# Patient Record
Sex: FEMALE | Race: WHITE | Employment: UNEMPLOYED | URBAN - NONMETROPOLITAN AREA
[De-identification: names, ages, dates, MRNs, and addresses within clinical notes are randomized per-mention and may not be internally consistent; named-entity substitution may affect disease eponyms.]

---

## 2023-03-17 ENCOUNTER — OFFICE VISIT (OUTPATIENT)
Dept: FAMILY MEDICINE CLINIC | Age: 53
End: 2023-03-17
Payer: COMMERCIAL

## 2023-03-17 ENCOUNTER — NURSE ONLY (OUTPATIENT)
Dept: LAB | Age: 53
End: 2023-03-17

## 2023-03-17 ENCOUNTER — TELEPHONE (OUTPATIENT)
Dept: FAMILY MEDICINE CLINIC | Age: 53
End: 2023-03-17

## 2023-03-17 VITALS
DIASTOLIC BLOOD PRESSURE: 84 MMHG | TEMPERATURE: 97.8 F | BODY MASS INDEX: 23.68 KG/M2 | SYSTOLIC BLOOD PRESSURE: 120 MMHG | HEART RATE: 85 BPM | OXYGEN SATURATION: 99 % | WEIGHT: 125.4 LBS | HEIGHT: 61 IN

## 2023-03-17 DIAGNOSIS — E78.2 MIXED HYPERLIPIDEMIA: ICD-10-CM

## 2023-03-17 DIAGNOSIS — D18.02 CAVERNOUS HEMANGIOMA OF BRAIN (HCC): ICD-10-CM

## 2023-03-17 DIAGNOSIS — K21.9 GASTROESOPHAGEAL REFLUX DISEASE WITHOUT ESOPHAGITIS: ICD-10-CM

## 2023-03-17 DIAGNOSIS — E55.9 HYPOVITAMINOSIS D: ICD-10-CM

## 2023-03-17 DIAGNOSIS — Z00.01 ENCOUNTER FOR WELL ADULT EXAM WITH ABNORMAL FINDINGS: Primary | ICD-10-CM

## 2023-03-17 DIAGNOSIS — J30.9 ALLERGIC SINUSITIS: ICD-10-CM

## 2023-03-17 LAB
25(OH)D3 SERPL-MCNC: 17 NG/ML (ref 30–100)
ALBUMIN SERPL BCG-MCNC: 4.5 G/DL (ref 3.5–5.1)
ALP SERPL-CCNC: 74 U/L (ref 38–126)
ALT SERPL W/O P-5'-P-CCNC: 6 U/L (ref 11–66)
ANION GAP SERPL CALC-SCNC: 9 MEQ/L (ref 8–16)
AST SERPL-CCNC: 10 U/L (ref 5–40)
BASOPHILS ABSOLUTE: 0.1 THOU/MM3 (ref 0–0.1)
BASOPHILS NFR BLD AUTO: 0.9 %
BILIRUB SERPL-MCNC: 0.4 MG/DL (ref 0.3–1.2)
BUN SERPL-MCNC: 13 MG/DL (ref 7–22)
CALCIUM SERPL-MCNC: 9.5 MG/DL (ref 8.5–10.5)
CHLORIDE SERPL-SCNC: 103 MEQ/L (ref 98–111)
CHOLEST SERPL-MCNC: 221 MG/DL (ref 100–199)
CO2 SERPL-SCNC: 27 MEQ/L (ref 23–33)
CREAT SERPL-MCNC: 0.7 MG/DL (ref 0.4–1.2)
DEPRECATED RDW RBC AUTO: 41.3 FL (ref 35–45)
EOSINOPHIL NFR BLD AUTO: 1.2 %
EOSINOPHILS ABSOLUTE: 0.1 THOU/MM3 (ref 0–0.4)
ERYTHROCYTE [DISTWIDTH] IN BLOOD BY AUTOMATED COUNT: 12.5 % (ref 11.5–14.5)
GFR SERPL CREATININE-BSD FRML MDRD: > 60 ML/MIN/1.73M2
GLUCOSE SERPL-MCNC: 91 MG/DL (ref 70–108)
HCT VFR BLD AUTO: 40.6 % (ref 37–47)
HDLC SERPL-MCNC: 45 MG/DL
HGB BLD-MCNC: 13.5 GM/DL (ref 12–16)
IMM GRANULOCYTES # BLD AUTO: 0.02 THOU/MM3 (ref 0–0.07)
IMM GRANULOCYTES NFR BLD AUTO: 0.4 %
LDLC SERPL CALC-MCNC: 145 MG/DL
LYMPHOCYTES ABSOLUTE: 1.4 THOU/MM3 (ref 1–4.8)
LYMPHOCYTES NFR BLD AUTO: 25.6 %
MCH RBC QN AUTO: 30.4 PG (ref 26–33)
MCHC RBC AUTO-ENTMCNC: 33.3 GM/DL (ref 32.2–35.5)
MCV RBC AUTO: 91.4 FL (ref 81–99)
MONOCYTES ABSOLUTE: 0.5 THOU/MM3 (ref 0.4–1.3)
MONOCYTES NFR BLD AUTO: 8.3 %
NEUTROPHILS NFR BLD AUTO: 63.6 %
NRBC BLD AUTO-RTO: 0 /100 WBC
PLATELET # BLD AUTO: 338 THOU/MM3 (ref 130–400)
PMV BLD AUTO: 10.7 FL (ref 9.4–12.4)
POTASSIUM SERPL-SCNC: 4.5 MEQ/L (ref 3.5–5.2)
PROT SERPL-MCNC: 6.9 G/DL (ref 6.1–8)
RBC # BLD AUTO: 4.44 MILL/MM3 (ref 4.2–5.4)
SEGMENTED NEUTROPHILS ABSOLUTE COUNT: 3.6 THOU/MM3 (ref 1.8–7.7)
SODIUM SERPL-SCNC: 139 MEQ/L (ref 135–145)
TRIGL SERPL-MCNC: 153 MG/DL (ref 0–199)
TSH SERPL DL<=0.005 MIU/L-ACNC: 1.95 UIU/ML (ref 0.4–4.2)
WBC # BLD AUTO: 5.6 THOU/MM3 (ref 4.8–10.8)

## 2023-03-17 PROCEDURE — 99386 PREV VISIT NEW AGE 40-64: CPT | Performed by: NURSE PRACTITIONER

## 2023-03-17 RX ORDER — ERGOCALCIFEROL 1.25 MG/1
50000 CAPSULE ORAL WEEKLY
Qty: 12 CAPSULE | Refills: 0 | Status: SHIPPED | OUTPATIENT
Start: 2023-03-17 | End: 2023-06-15

## 2023-03-17 RX ORDER — ACETAMINOPHEN 500 MG
500 TABLET ORAL EVERY 6 HOURS PRN
COMMUNITY

## 2023-03-17 SDOH — ECONOMIC STABILITY: INCOME INSECURITY: HOW HARD IS IT FOR YOU TO PAY FOR THE VERY BASICS LIKE FOOD, HOUSING, MEDICAL CARE, AND HEATING?: NOT HARD AT ALL

## 2023-03-17 SDOH — ECONOMIC STABILITY: HOUSING INSECURITY
IN THE LAST 12 MONTHS, WAS THERE A TIME WHEN YOU DID NOT HAVE A STEADY PLACE TO SLEEP OR SLEPT IN A SHELTER (INCLUDING NOW)?: NO

## 2023-03-17 SDOH — ECONOMIC STABILITY: FOOD INSECURITY: WITHIN THE PAST 12 MONTHS, YOU WORRIED THAT YOUR FOOD WOULD RUN OUT BEFORE YOU GOT MONEY TO BUY MORE.: NEVER TRUE

## 2023-03-17 SDOH — ECONOMIC STABILITY: FOOD INSECURITY: WITHIN THE PAST 12 MONTHS, THE FOOD YOU BOUGHT JUST DIDN'T LAST AND YOU DIDN'T HAVE MONEY TO GET MORE.: NEVER TRUE

## 2023-03-17 ASSESSMENT — ENCOUNTER SYMPTOMS
EYE PAIN: 0
ABDOMINAL DISTENTION: 0
ABDOMINAL PAIN: 0
EYE ITCHING: 1
RHINORRHEA: 1
EYE REDNESS: 0
CONSTIPATION: 0
SINUS PRESSURE: 1
SHORTNESS OF BREATH: 0
DIARRHEA: 0
CHEST TIGHTNESS: 0
WHEEZING: 0
COUGH: 0

## 2023-03-17 ASSESSMENT — PATIENT HEALTH QUESTIONNAIRE - PHQ9
SUM OF ALL RESPONSES TO PHQ QUESTIONS 1-9: 2
SUM OF ALL RESPONSES TO PHQ QUESTIONS 1-9: 2
1. LITTLE INTEREST OR PLEASURE IN DOING THINGS: 1
SUM OF ALL RESPONSES TO PHQ9 QUESTIONS 1 & 2: 2
2. FEELING DOWN, DEPRESSED OR HOPELESS: 1
SUM OF ALL RESPONSES TO PHQ QUESTIONS 1-9: 2
SUM OF ALL RESPONSES TO PHQ QUESTIONS 1-9: 2

## 2023-03-17 NOTE — TELEPHONE ENCOUNTER
----- Message from Chrisandra Jeans, APRN - CNP sent at 3/17/2023  5:06 PM EDT -----  Labs show diminished vitamin D levels, and slightly elevated cholesterol level. Will supplement the vitamin D for 3 months then recheck levels. Will hold off on cholesterol medication at this time. Continue to work with diet and activity level. Low sugar low cholesterol diet. Otherwise labs were wnl.

## 2023-03-17 NOTE — PATIENT INSTRUCTIONS
High Cholesterol: Care Instructions  Overview     Cholesterol is a type of fat in your blood. It is needed for many body functions, such as making new cells. Cholesterol is made by your body. It also comes from food you eat. High cholesterol means that you have too much of the fat in your blood. This raises your risk of a heart attack and stroke. LDL and HDL are part of your total cholesterol. LDL is the \"bad\" cholesterol. High LDL can raise your risk for coronary artery disease, heart attack, and stroke. HDL is the \"good\" cholesterol. It helps clear bad cholesterol from the body. High HDL is linked with a lower risk of coronary artery disease, heart attack, and stroke. Your cholesterol levels help your doctor find out your risk for having a heart attack or stroke. You and your doctor can talk about whether you need to lower your risk and what treatment is best for you. Treatment options include a heart-healthy lifestyle and medicine. Both options can help lower your cholesterol and your risk. The way you choose to lower your risk will depend on how high your risk is for heart attack and stroke. It will also depend on how you feel about taking medicines. Follow-up care is a key part of your treatment and safety. Be sure to make and go to all appointments, and call your doctor if you are having problems. It's also a good idea to know your test results and keep a list of the medicines you take. How can you care for yourself at home? Eat heart-healthy foods. Eat fruits, vegetables, whole grains, beans, and other high-fiber foods. Eat lean proteins, such as seafood, lean meats, beans, nuts, and soy products. Eat healthy fats, such as canola and olive oil. Choose foods that are low in saturated fat. Limit sodium and alcohol. Limit drinks and foods with added sugar. Be physically active. Try to do moderate activity at least 2½ hours a week. Or try to do vigorous activity at least 1¼ hours a week.  You may want to walk or try other activities, such as running, swimming, cycling, or playing tennis or team sports. Stay at a healthy weight or lose weight by making the changes in eating and physical activity listed above. Losing just a small amount of weight, even 5 to 10 pounds, can help reduce your risk for having a heart attack or stroke. Do not smoke. Manage other health problems. These include diabetes and high blood pressure. If you think you may have a problem with alcohol or drug use, talk to your doctor. If you take medicine, take it exactly as prescribed. Call your doctor if you think you are having a problem with your medicine. Check with your doctor or pharmacist before you use any other medicines, including over-the-counter medicines. Make sure your doctor knows all of the medicines, vitamins, herbal products, and supplements you take. Taking some medicines together can cause problems. When should you call for help? Watch closely for changes in your health, and be sure to contact your doctor if:    You need help making lifestyle changes. You have questions about your medicine. Where can you learn more? Go to http://GoSpotCheck.woods.com/ and enter I065 to learn more about \"High Cholesterol: Care Instructions. \"  Current as of: September 7, 2022               Content Version: 13.5  © 2006-2022 Healthwise, [employer]. Care instructions adapted under license by [facility] ([facility]). If you have questions about a medical condition or this instruction, always ask your healthcare professional. [place] [address] any warranty or liability for your use of this information. Learning About High Cholesterol  What is high cholesterol? High cholesterol means that you have too much cholesterol in your blood. Cholesterol is a type of fat. It's needed for many body functions, such as making new cells. It's made by your body.  It also comes from food you eat (meat and dairy products). Having high cholesterol can lead to the buildup of fatty deposits called plaque (say \"plak\") in artery walls. This can increase your risk of heart attack and stroke. When doctors talk about high cholesterol, they are talking about your total cholesterol and LDL cholesterol levels. LDL is sometimes called the \"bad\" cholesterol. Your doctor may also speak about HDL levels. HDL is sometimes called the \"good\" cholesterol. High HDL is linked with a lower risk for heart attack and stroke. How can you help prevent high cholesterol? Eat high-fiber foods, such as fruits, vegetables, and whole grains. Eat lean proteins, such as seafood, lean meats, and beans. Eat healthy fats, such as canola and olive oil. Choose foods that are low in saturated fat, such as avocados, nuts, and low-fat milk or yogurt. Limit sodium and alcohol. Limit drinks and foods with added sugar. Try to be active at least 2½ hours a week. Get to and stay at a healthy weight. Don't use tobacco or nicotine, and talk to your doctor if you need help quitting. How is high cholesterol treated? Treatment focuses on heart-healthy lifestyle changes. It may also include medicines. Treatment reduces your risk for a heart attack or stroke. The goal of treatment is to lower your LDL cholesterol levels. Where can you learn more? Go to http://www.woods.com/ and enter Q621 to learn more about \"Learning About High Cholesterol. \"  Current as of: October 6, 2021               Content Version: 13.5  © 2006-2022 [employer]. Care instructions adapted under license by [facility] ([facility]). If you have questions about a medical condition or this instruction, always ask your healthcare professional. [place] [address] any warranty or liability for your use of this information. Learning About High Triglycerides  What are high triglycerides?      Triglycerides are a type of fat in your blood. Your body uses them for energy. You need some for good health. But high triglyceride levels are linked with a higher risk of coronary artery disease. A high level may be a sign of metabolic syndrome. Very high levels raise your risk of pancreatitis. What causes them? High triglycerides can run in families. They may also be caused by other conditions, like obesity and diabetes. You may have high levels of this fat if you eat or drink too many foods or drinks with added sugar or if you drink a lot of alcohol. And some medicines can cause this condition. What are their symptoms? High triglycerides usually don't cause symptoms. But if the condition is genetic, you may see fatty bumps under your skin. How are they diagnosed? A blood test is used to measure triglycerides. It's most accurate if it's done after you go without food or drink for 9 to 14 hours (fasting). Triglyceride levels are:  Normal when they are less than 150 mg/dL. Moderately high when they are 150 to 499 mg/dL. Very high when they are 500 mg/dL or higher. How are they treated? A healthy lifestyle can help lower your triglycerides and your risk of coronary artery disease. It includes losing weight, being active, limiting high-sugar foods and drinks, and limiting alcohol. Your doctor may recommend that you also take medicine. Your doctor will treat other health problems if they are causing high levels. How do you care for yourself when you have high triglycerides? A healthy diet and lifestyle can help lower your triglycerides level and lower your risk of coronary artery disease. Lose weight, and stay at a healthy weight. Triglycerides are stored as fat in your tissues and muscles. Limit foods and drinks that have a lot of sugar. These include sugar-sweetened desserts, soda pop, and fruit juice. Limit saturated fats. These are found in animal-based foods like meat, butter, milk, and cheese.  They are also found in coconut oil, palm oil, and cocoa butter. Choose a heart-healthy eating plan. Eat a diet that's rich in vegetables, whole grains, fish, lean meats, and low-fat or nonfat dairy foods. Eating oily fish may lower your levels. These fish include salmon, mackerel, lake trout, herring, and sardines. Limit or avoid alcohol. Limit alcohol to 2 drinks a day for men and 1 drink a day for women. Alcohol has a strong effect on triglycerides. Be active on most days of the week. Before you start to be more active, check with your doctor to be sure it's safe. Try to do moderate activity at least 2½ hours a week. Or try to do vigorous activity at least 1¼ hours a week. If you have diabetes, keep your blood sugar in your target range. Don't smoke. If you need help quitting, talk to your doctor about stop-smoking programs and medicines. These can increase your chances of quitting for good. Take your medicines exactly as prescribed. Call your doctor if you think you are having a problem with your medicine. Where can you learn more? Go to http://www.woods.com/ and enter T123 to learn more about \"Learning About High Triglycerides. \"  Current as of: September 7, 2022               Content Version: 13.5  © 2006-2022 Healthwise, [employer]. Care instructions adapted under license by [facility] ([facility]). If you have questions about a medical condition or this instruction, always ask your healthcare professional. [place] [address] any warranty or liability for your use of this information.

## 2023-03-17 NOTE — TELEPHONE ENCOUNTER
I copy and pasted Beatriz's response regarding the patient's lab results and sent it to the patient via My Chart.

## 2023-03-17 NOTE — PROGRESS NOTES
[address] [facility]  Dept: [phone]          Karyle Lins (:  1970) is a 48 y.o. female,New patient, here for evaluation of the following chief complaint(s):  New Patient, Establish Care, and Eye Problem      ASSESSMENT/PLAN:  I have reviewed the patient's medical history in detail and updated the computerized patient record. HPI/ROS per the patient and caregiver. Overall non toxic in appearance. Answers questions appropriately. Conditions discussed and addressed this visit include:   Here to establish care  Overall well adult  No hx of htn  + hemangioma, being monitored at this time  Will need neuro referral  Refer to ENT for chronic allergic sinusitis  Well adult information provided to pt. The patient is advised to begin progressive daily aerobic exercise program, follow a low fat, low cholesterol diet, attempt to lose weight, reduce salt in diet and cooking, reduce exposure to stress, improve dietary compliance, continue current medications, and continue current healthy lifestyle patterns. 1. Encounter for well adult exam with abnormal findings  2. Cavernous hemangioma of brain (HCC)  -     CBC with Auto Differential; Future  -     TSH with Reflex; Future  3. Allergic sinusitis  -     External Referral To ENT  -     CBC with Auto Differential; Future  4. Gastroesophageal reflux disease without esophagitis  -     Comprehensive Metabolic Panel; Future  -     CBC with Auto Differential; Future  -     TSH with Reflex; Future  5. Mixed hyperlipidemia  -     Lipid Panel; Future  -     TSH with Reflex; Future  6. Hypovitaminosis D  -     Vitamin D 25 Hydroxy; Future    Return in about 3 months (around 2023) for Results review, Routine follow up. SUBJECTIVE/OBJECTIVE:  Here to establish care  Hx of hemangioma in 2020  Hx of allergies  Sinus problem  Perimenopausal   Takes desensitizing therapy for allergies    Eye Problem   Associated symptoms include itching. Pertinent negatives include no eye redness or weakness. Review of Systems   Constitutional:  Negative for activity change, appetite change, fatigue and unexpected weight change. HENT:  Positive for congestion, postnasal drip, rhinorrhea and sinus pressure. Negative for ear discharge, ear pain, nosebleeds and tinnitus. Eyes:  Positive for itching. Negative for pain and redness. Respiratory:  Negative for cough, chest tightness, shortness of breath and wheezing. Cardiovascular:  Negative for chest pain. Gastrointestinal:  Negative for abdominal distention, abdominal pain, constipation and diarrhea. Endocrine: Negative for cold intolerance and heat intolerance. Genitourinary:  Positive for menstrual problem. Negative for difficulty urinating and dysuria. Musculoskeletal:  Negative for arthralgias and myalgias. Skin:  Negative for pallor and rash. Allergic/Immunologic: Negative for environmental allergies, food allergies and immunocompromised state. Neurological:  Negative for dizziness, weakness, light-headedness and numbness. Hematological:  Negative for adenopathy. Does not bruise/bleed easily. Psychiatric/Behavioral:  Negative for behavioral problems and decreased concentration. The patient is not nervous/anxious. All other systems reviewed and are negative. Physical Exam  Vitals and nursing note reviewed. Constitutional:       Appearance: Normal appearance. She is well-developed and normal weight. She is not ill-appearing. HENT:      Head: Normocephalic and atraumatic. Right Ear: Tympanic membrane, ear canal and external ear normal.      Left Ear: Tympanic membrane, ear canal and external ear normal.      Nose: Rhinorrhea (clear) present. Mouth/Throat:      Mouth: Mucous membranes are moist.      Pharynx: No oropharyngeal exudate or posterior oropharyngeal erythema. Eyes:      General:         Right eye: No discharge. Left eye: No discharge. Conjunctiva/sclera: Conjunctivae normal.   Neck:      Thyroid: No thyromegaly. Cardiovascular:      Rate and Rhythm: Normal rate and regular rhythm. Pulses: Normal pulses. Heart sounds: Normal heart sounds. Pulmonary:      Effort: Pulmonary effort is normal. No respiratory distress. Breath sounds: Normal breath sounds. No wheezing or rales. Chest:      Chest wall: No tenderness. Abdominal:      General: Bowel sounds are normal. There is no distension. Palpations: Abdomen is soft. Tenderness: There is no abdominal tenderness. Musculoskeletal:         General: No tenderness. Normal range of motion. Cervical back: Normal range of motion and neck supple. No tenderness. No muscular tenderness. Lymphadenopathy:      Cervical: No cervical adenopathy. Skin:     General: Skin is warm and dry. Capillary Refill: Capillary refill takes less than 2 seconds. Coloration: Skin is not pale. Findings: No erythema or rash. Neurological:      General: No focal deficit present. Mental Status: She is alert and oriented to person, place, and time. Mental status is at baseline. Cranial Nerves: No cranial nerve deficit. Motor: No abnormal muscle tone. Coordination: Coordination normal.      Deep Tendon Reflexes: Reflexes are normal and symmetric. Reflexes normal.   Psychiatric:         Behavior: Behavior normal.         Thought Content: Thought content normal.         Judgment: Judgment normal.               An electronic signature was used to authenticate this note.     --JOSE Almonte - CNP

## 2023-03-20 NOTE — TELEPHONE ENCOUNTER
I wrote on the lab order to have it done on or around 06- and hi-lited it. I then gave it to Magdalene to mail to the patient today.

## 2023-04-17 ENCOUNTER — NURSE ONLY (OUTPATIENT)
Dept: LAB | Age: 53
End: 2023-04-17

## 2023-04-18 ENCOUNTER — HOSPITAL ENCOUNTER (OUTPATIENT)
Dept: MAMMOGRAPHY | Age: 53
Discharge: HOME | End: 2023-04-18
Payer: COMMERCIAL

## 2023-04-18 DIAGNOSIS — Z12.31 VISIT FOR SCREENING MAMMOGRAM: ICD-10-CM

## 2023-04-18 PROCEDURE — 77063 BREAST TOMOSYNTHESIS BI: CPT

## 2023-05-02 LAB — CYTOLOGY THIN PREP PAP: NORMAL

## 2023-06-20 ENCOUNTER — NURSE ONLY (OUTPATIENT)
Dept: LAB | Age: 53
End: 2023-06-20

## 2023-06-20 ENCOUNTER — TELEPHONE (OUTPATIENT)
Dept: FAMILY MEDICINE CLINIC | Age: 53
End: 2023-06-20

## 2023-06-20 DIAGNOSIS — E55.9 HYPOVITAMINOSIS D: ICD-10-CM

## 2023-06-20 LAB — 25(OH)D3 SERPL-MCNC: 43 NG/ML (ref 30–100)

## 2023-06-20 NOTE — TELEPHONE ENCOUNTER
----- Message from JOSE Harrell CNP sent at 6/20/2023  2:16 PM EDT -----  Vitamin D wnl at this time. Can stop the vitamin d supplementation. Should be on a womens one  day vitamin to maintain levels.

## 2023-06-20 NOTE — TELEPHONE ENCOUNTER
I copy and pasted Beatriz's response regarding the patient's lab results and sent them to the patient via My Chart.

## 2023-06-21 NOTE — TELEPHONE ENCOUNTER
I called and spoke to the patient. I let her know Beatriz's response regarding her vitamin d results. She voiced understanding.

## 2024-03-24 ASSESSMENT — PATIENT HEALTH QUESTIONNAIRE - PHQ9
SUM OF ALL RESPONSES TO PHQ9 QUESTIONS 1 & 2: 1
2. FEELING DOWN, DEPRESSED OR HOPELESS: NOT AT ALL
SUM OF ALL RESPONSES TO PHQ9 QUESTIONS 1 & 2: 1
1. LITTLE INTEREST OR PLEASURE IN DOING THINGS: SEVERAL DAYS
SUM OF ALL RESPONSES TO PHQ QUESTIONS 1-9: 1
1. LITTLE INTEREST OR PLEASURE IN DOING THINGS: SEVERAL DAYS
SUM OF ALL RESPONSES TO PHQ QUESTIONS 1-9: 1
SUM OF ALL RESPONSES TO PHQ QUESTIONS 1-9: 1
2. FEELING DOWN, DEPRESSED OR HOPELESS: NOT AT ALL
SUM OF ALL RESPONSES TO PHQ QUESTIONS 1-9: 1

## 2024-03-27 ENCOUNTER — OFFICE VISIT (OUTPATIENT)
Dept: FAMILY MEDICINE CLINIC | Age: 54
End: 2024-03-27
Payer: COMMERCIAL

## 2024-03-27 ENCOUNTER — NURSE ONLY (OUTPATIENT)
Dept: LAB | Age: 54
End: 2024-03-27

## 2024-03-27 ENCOUNTER — TELEPHONE (OUTPATIENT)
Dept: FAMILY MEDICINE CLINIC | Age: 54
End: 2024-03-27

## 2024-03-27 VITALS
WEIGHT: 129 LBS | BODY MASS INDEX: 25.32 KG/M2 | DIASTOLIC BLOOD PRESSURE: 62 MMHG | HEIGHT: 60 IN | HEART RATE: 60 BPM | TEMPERATURE: 96.1 F | SYSTOLIC BLOOD PRESSURE: 100 MMHG | OXYGEN SATURATION: 99 %

## 2024-03-27 DIAGNOSIS — E55.9 HYPOVITAMINOSIS D: ICD-10-CM

## 2024-03-27 DIAGNOSIS — Z00.01 ENCOUNTER FOR WELL ADULT EXAM WITH ABNORMAL FINDINGS: ICD-10-CM

## 2024-03-27 DIAGNOSIS — K21.9 GASTROESOPHAGEAL REFLUX DISEASE WITHOUT ESOPHAGITIS: ICD-10-CM

## 2024-03-27 DIAGNOSIS — D18.02 CAVERNOUS HEMANGIOMA OF BRAIN (HCC): Primary | ICD-10-CM

## 2024-03-27 DIAGNOSIS — E78.2 MIXED HYPERLIPIDEMIA: ICD-10-CM

## 2024-03-27 DIAGNOSIS — D18.02 CAVERNOUS HEMANGIOMA OF BRAIN (HCC): ICD-10-CM

## 2024-03-27 LAB
25(OH)D3 SERPL-MCNC: 19 NG/ML (ref 30–100)
ALBUMIN SERPL BCG-MCNC: 4.3 G/DL (ref 3.5–5.1)
ALP SERPL-CCNC: 58 U/L (ref 38–126)
ALT SERPL W/O P-5'-P-CCNC: 7 U/L (ref 11–66)
ANION GAP SERPL CALC-SCNC: 11 MEQ/L (ref 8–16)
AST SERPL-CCNC: 11 U/L (ref 5–40)
BASOPHILS ABSOLUTE: 0 THOU/MM3 (ref 0–0.1)
BASOPHILS NFR BLD AUTO: 0.5 %
BILIRUB SERPL-MCNC: 0.5 MG/DL (ref 0.3–1.2)
BUN SERPL-MCNC: 14 MG/DL (ref 7–22)
CALCIUM SERPL-MCNC: 8.9 MG/DL (ref 8.5–10.5)
CHLORIDE SERPL-SCNC: 104 MEQ/L (ref 98–111)
CHOLEST SERPL-MCNC: 224 MG/DL (ref 100–199)
CO2 SERPL-SCNC: 24 MEQ/L (ref 23–33)
CREAT SERPL-MCNC: 0.8 MG/DL (ref 0.4–1.2)
DEPRECATED RDW RBC AUTO: 41.2 FL (ref 35–45)
EOSINOPHIL NFR BLD AUTO: 0.5 %
EOSINOPHILS ABSOLUTE: 0 THOU/MM3 (ref 0–0.4)
ERYTHROCYTE [DISTWIDTH] IN BLOOD BY AUTOMATED COUNT: 12.4 % (ref 11.5–14.5)
GFR SERPL CREATININE-BSD FRML MDRD: 89 ML/MIN/1.73M2
GLUCOSE SERPL-MCNC: 87 MG/DL (ref 70–108)
HCT VFR BLD AUTO: 40.5 % (ref 37–47)
HDLC SERPL-MCNC: 52 MG/DL
HGB BLD-MCNC: 13.7 GM/DL (ref 12–16)
IMM GRANULOCYTES # BLD AUTO: 0.02 THOU/MM3 (ref 0–0.07)
IMM GRANULOCYTES NFR BLD AUTO: 0.3 %
LDLC SERPL CALC-MCNC: 147 MG/DL
LYMPHOCYTES ABSOLUTE: 1.4 THOU/MM3 (ref 1–4.8)
LYMPHOCYTES NFR BLD AUTO: 22.4 %
MCH RBC QN AUTO: 30.9 PG (ref 26–33)
MCHC RBC AUTO-ENTMCNC: 33.8 GM/DL (ref 32.2–35.5)
MCV RBC AUTO: 91.2 FL (ref 81–99)
MONOCYTES ABSOLUTE: 0.5 THOU/MM3 (ref 0.4–1.3)
MONOCYTES NFR BLD AUTO: 7.5 %
NEUTROPHILS NFR BLD AUTO: 68.8 %
NRBC BLD AUTO-RTO: 0 /100 WBC
PLATELET # BLD AUTO: 299 THOU/MM3 (ref 130–400)
PMV BLD AUTO: 11 FL (ref 9.4–12.4)
POTASSIUM SERPL-SCNC: 4.1 MEQ/L (ref 3.5–5.2)
PROT SERPL-MCNC: 7 G/DL (ref 6.1–8)
RBC # BLD AUTO: 4.44 MILL/MM3 (ref 4.2–5.4)
SEGMENTED NEUTROPHILS ABSOLUTE COUNT: 4.3 THOU/MM3 (ref 1.8–7.7)
SODIUM SERPL-SCNC: 139 MEQ/L (ref 135–145)
TRIGL SERPL-MCNC: 127 MG/DL (ref 0–199)
TSH SERPL DL<=0.005 MIU/L-ACNC: 1.94 UIU/ML (ref 0.4–4.2)
WBC # BLD AUTO: 6.2 THOU/MM3 (ref 4.8–10.8)

## 2024-03-27 PROCEDURE — 99396 PREV VISIT EST AGE 40-64: CPT | Performed by: NURSE PRACTITIONER

## 2024-03-27 SDOH — ECONOMIC STABILITY: FOOD INSECURITY: WITHIN THE PAST 12 MONTHS, YOU WORRIED THAT YOUR FOOD WOULD RUN OUT BEFORE YOU GOT MONEY TO BUY MORE.: NEVER TRUE

## 2024-03-27 SDOH — ECONOMIC STABILITY: FOOD INSECURITY: WITHIN THE PAST 12 MONTHS, THE FOOD YOU BOUGHT JUST DIDN'T LAST AND YOU DIDN'T HAVE MONEY TO GET MORE.: NEVER TRUE

## 2024-03-27 SDOH — ECONOMIC STABILITY: INCOME INSECURITY: HOW HARD IS IT FOR YOU TO PAY FOR THE VERY BASICS LIKE FOOD, HOUSING, MEDICAL CARE, AND HEATING?: NOT HARD AT ALL

## 2024-03-27 ASSESSMENT — ENCOUNTER SYMPTOMS
CONSTIPATION: 0
ABDOMINAL DISTENTION: 0
EYE PAIN: 0
DIARRHEA: 0
CHEST TIGHTNESS: 0
EYE REDNESS: 0
SHORTNESS OF BREATH: 0
WHEEZING: 0
COUGH: 0
ABDOMINAL PAIN: 0

## 2024-03-27 NOTE — PROGRESS NOTES
[address] [place] [id number]  Dept: [phone]          Paula Benavides (:  1970) is a 54 y.o. female,Established patient, here for evaluation of the following chief complaint(s):  Annual Exam and Forms (For her husbands insurance.)      ASSESSMENT/PLAN:  I have reviewed the patient's medical history in detail and updated the computerized patient record.  HPI/ROS per the patient and caregiver.   Overall non toxic in appearance. Answers questions appropriately.   Conditions discussed and addressed this visit include:   The 10-year ASCVD risk score (Alexandr FIGUEROA, et al., 2019) is: 1.3%    Values used to calculate the score:      Age: 54 years      Sex: Female      Is Non- : No      Diabetic: No      Tobacco smoker: No      Systolic Blood Pressure: 100 mmHg      Is BP treated: No      HDL Cholesterol: 45 mg/dL      Total Cholesterol: 221 mg/dL    1. Cavernous hemangioma of brain (HCC)  -     Lipid Panel; Future  -     Comprehensive Metabolic Panel; Future  -     CBC with Auto Differential; Future  -     TSH with Reflex; Future  -     Vitamin D 25 Hydroxy; Future  2. Encounter for well adult exam with abnormal findings  -     Lipid Panel; Future  -     Comprehensive Metabolic Panel; Future  -     CBC with Auto Differential; Future  -     TSH with Reflex; Future  -     Vitamin D 25 Hydroxy; Future  3. Mixed hyperlipidemia  -     Lipid Panel; Future  -     Comprehensive Metabolic Panel; Future  -     CBC with Auto Differential; Future  -     TSH with Reflex; Future  -     Vitamin D 25 Hydroxy; Future  4. Hypovitaminosis D  -     Lipid Panel; Future  -     Comprehensive Metabolic Panel; Future  -     CBC with Auto Differential; Future  -     TSH with Reflex; Future  -     Vitamin D 25 Hydroxy; Future  5. Gastroesophageal reflux disease without esophagitis  -     Lipid Panel; Future  -     Comprehensive Metabolic Panel; Future  -     CBC with Auto Differential; Future  -

## 2024-03-27 NOTE — TELEPHONE ENCOUNTER
----- Message from JOSE Cervantes - CNP sent at 3/27/2024  3:04 PM EDT -----    Labs wnl. Would supplement Vitamin D3 with 5000 IU per day for total of 35, 000 per week. 1.4% risk score for cardiovascular event. Reheck labs in 1 year. Continue healthy lifestyle patterns.

## 2024-04-19 ENCOUNTER — HOSPITAL ENCOUNTER (OUTPATIENT)
Dept: MAMMOGRAPHY | Age: 54
Discharge: HOME | End: 2024-04-19
Payer: COMMERCIAL

## 2024-04-19 DIAGNOSIS — Z12.31 VISIT FOR SCREENING MAMMOGRAM: ICD-10-CM

## 2024-04-19 PROCEDURE — 77063 BREAST TOMOSYNTHESIS BI: CPT

## 2024-04-29 ENCOUNTER — HOSPITAL ENCOUNTER (OUTPATIENT)
Dept: WOMENS IMAGING | Age: 54
Discharge: HOME | End: 2024-04-29
Attending: OBSTETRICS & GYNECOLOGY
Payer: COMMERCIAL

## 2024-04-29 DIAGNOSIS — M81.0 AGE RELATED OSTEOPOROSIS, UNSPECIFIED PATHOLOGICAL FRACTURE PRESENCE: ICD-10-CM

## 2024-04-29 PROCEDURE — 77080 DXA BONE DENSITY AXIAL: CPT

## 2024-07-27 SDOH — HEALTH STABILITY: PHYSICAL HEALTH: ON AVERAGE, HOW MANY MINUTES DO YOU ENGAGE IN EXERCISE AT THIS LEVEL?: 50 MIN

## 2024-07-27 SDOH — HEALTH STABILITY: PHYSICAL HEALTH: ON AVERAGE, HOW MANY DAYS PER WEEK DO YOU ENGAGE IN MODERATE TO STRENUOUS EXERCISE (LIKE A BRISK WALK)?: 5 DAYS

## 2024-07-30 ENCOUNTER — LAB (OUTPATIENT)
Dept: LAB | Age: 54
End: 2024-07-30

## 2024-07-30 ENCOUNTER — OFFICE VISIT (OUTPATIENT)
Dept: FAMILY MEDICINE CLINIC | Age: 54
End: 2024-07-30
Payer: COMMERCIAL

## 2024-07-30 VITALS
WEIGHT: 127.6 LBS | OXYGEN SATURATION: 98 % | HEART RATE: 80 BPM | TEMPERATURE: 98.6 F | SYSTOLIC BLOOD PRESSURE: 118 MMHG | HEIGHT: 62 IN | DIASTOLIC BLOOD PRESSURE: 72 MMHG | BODY MASS INDEX: 23.48 KG/M2

## 2024-07-30 DIAGNOSIS — K21.9 GASTROESOPHAGEAL REFLUX DISEASE WITHOUT ESOPHAGITIS: ICD-10-CM

## 2024-07-30 DIAGNOSIS — E55.9 HYPOVITAMINOSIS D: ICD-10-CM

## 2024-07-30 DIAGNOSIS — M80.00XA AGE-RELATED OSTEOPOROSIS WITH CURRENT PATHOLOGICAL FRACTURE, INITIAL ENCOUNTER: ICD-10-CM

## 2024-07-30 DIAGNOSIS — D18.02 CAVERNOUS HEMANGIOMA OF BRAIN (HCC): Primary | ICD-10-CM

## 2024-07-30 LAB — 25(OH)D3 SERPL-MCNC: 46 NG/ML (ref 30–100)

## 2024-07-30 PROCEDURE — 99214 OFFICE O/P EST MOD 30 MIN: CPT | Performed by: NURSE PRACTITIONER

## 2024-07-30 ASSESSMENT — ENCOUNTER SYMPTOMS
TROUBLE SWALLOWING: 0
CONSTIPATION: 0
ABDOMINAL PAIN: 0
EYE PAIN: 0
DIARRHEA: 0
VOMITING: 0
EYE DISCHARGE: 0
SHORTNESS OF BREATH: 0
EYE REDNESS: 0
NAUSEA: 0
COUGH: 0
WHEEZING: 0
RHINORRHEA: 0
BACK PAIN: 0
ALLERGIC/IMMUNOLOGIC NEGATIVE: 1
SORE THROAT: 0

## 2024-07-30 NOTE — PROGRESS NOTES
[facility]  [address] [address]  Dept: [phone]  Dept Fax: [phone]  Loc: [phone]     Visit Date:  7/30/2024      Patient:  Paula Benavides  YOB: 1970    HPI:         Chief Complaint  Patient presents with  · New Patient    Prior patient of Beatriz Block; patient has no concerns she did have a screening done that she has questions regarding.     Recently had a foot fracture followed by DEXA scan and was diagnosed with osteoporosis.  She has a follow-up with rheumatology planned.          Medications    Current Outpatient Medications:     Cholecalciferol (VITAMIN D3) 1.25 MG (77479 UT) TABS, , Disp: , Rfl:     acetaminophen (TYLENOL) 500 MG tablet, Take 1 tablet by mouth every 6 hours as needed for Pain, Disp: , Rfl:     The patient has No Known Allergies.    Past Medical History  Paula  has a past medical history of Cavernous hemangioma of brain (HCC), Gastroesophageal reflux disease without esophagitis, Mixed hyperlipidemia, and Seasonal allergic rhinitis.    Subjective:      Review of Systems   Constitutional:  Negative for activity change, fatigue and fever.   HENT:  Negative for congestion, ear pain, rhinorrhea, sore throat and trouble swallowing.    Eyes:  Negative for pain, discharge and redness.   Respiratory:  Negative for cough, shortness of breath and wheezing.    Cardiovascular: Negative.    Gastrointestinal:  Negative for abdominal pain, constipation, diarrhea, nausea and vomiting.   Endocrine: Negative.    Genitourinary:  Negative for dysuria, frequency and urgency.   Musculoskeletal:  Negative for arthralgias, back pain and myalgias.   Skin:  Negative for rash.   Allergic/Immunologic: Negative.    Neurological:  Negative for dizziness, tremors, weakness and headaches.   Hematological: Negative.    Psychiatric/Behavioral:  Negative for dysphoric mood and sleep disturbance. The patient is

## 2024-09-02 ASSESSMENT — RHEUMATOLOGY NEW PATIENT QUESTIONNAIRE
HOARSE VOICE: N
JOINT SWELLING: N
CHEST PAIN: N
LOSS OF CONSCIOUSNESS: N
JAUNDICE: N
UNUSUAL BLEEDING: N
SEIZURES: N
BEHAVIORAL CHANGES: N
DIFFICULTY SWALLOWING: N
DIFFICULTY FALLING ASLEEP: N
LOSS OF VISION: N
SKIN REDNESS: N
SWOLLEN LEGS OR FEET: N
VOMITING OF BLOOD OR COFFEE GROUND CONSISTENCY MATERIAL: N
DOUBLE OR BLURRED VISION: N
HEADACHES: N
PERSISTENT DIARRHEA: N
AGITATION: N
BLOOD IN STOOLS: N
EASILY LOSING TEMPER: N
FEVER: N
COLOR CHANGES OF HANDS OR FEET IN THE COLD: N
HEARTBURN OR REFLUX: Y
BLACK STOOLS: N
ANXIETY: Y
NAUSEA: N
NUMBNESS OR TINGLING IN HANDS OR FEET: N
NODULES/BUMPS: N
FAINTING: N
RASH OR ULCERS: N
UNEXPLAINED HEARING LOSS: N
SUN SENSITIVE (SUN ALLERGY): N
SKIN TIGHTNESS: N
ANEMIA: N
EASY BRUISING: Y
DRYNESS OF MOUTH: N
EYE DRYNESS: N
VAGINAL DRYNESS: N
UNUSUAL FATIGUE: N
SORES IN MOUTH OR NOSE: N
ABNORMAL URINE: N
SHORTNESS OF BREATH: N
UNEXPLAINED WEIGHT CHANGE: N
UNUSUALLY RAPID OR SLOWED HEART RATE: N
INCREASED SUSCEPTIBILITY TO INFECTION: N
EYE REDNESS: N
MUSCLE WEAKNESS: N
MORNING STIFFNESS: N
DEPRESSION: Y
COUGH: N
MEMORY LOSS: N
JOINT PAIN: N
STOMACH PAIN: N
PAIN OR BURNING ON URINATION: N
SWOLLEN OR TENDER GLANDS: N
DIFFICULTY STAYING ASLEEP: Y
EXCESSIVE HAIR LOSS (MORE THAN YOUR NORM): N
EYE PAIN: N
DIFFICULTY BREATHING LYING DOWN: N
NIGHT SWEATS: N
RASH: N

## 2024-09-05 ENCOUNTER — OFFICE VISIT (OUTPATIENT)
Dept: RHEUMATOLOGY | Age: 54
End: 2024-09-05
Payer: COMMERCIAL

## 2024-09-05 VITALS
WEIGHT: 128.2 LBS | HEART RATE: 69 BPM | OXYGEN SATURATION: 97 % | SYSTOLIC BLOOD PRESSURE: 140 MMHG | DIASTOLIC BLOOD PRESSURE: 70 MMHG | HEIGHT: 62 IN | BODY MASS INDEX: 23.59 KG/M2

## 2024-09-05 DIAGNOSIS — M81.0 OSTEOPOROSIS WITHOUT CURRENT PATHOLOGICAL FRACTURE, UNSPECIFIED OSTEOPOROSIS TYPE: Primary | ICD-10-CM

## 2024-09-05 PROCEDURE — 99204 OFFICE O/P NEW MOD 45 MIN: CPT | Performed by: INTERNAL MEDICINE

## 2024-09-05 RX ORDER — SODIUM CHLORIDE 9 MG/ML
5-250 INJECTION, SOLUTION INTRAVENOUS PRN
OUTPATIENT
Start: 2024-09-05

## 2024-09-05 RX ORDER — SODIUM CHLORIDE 9 MG/ML
INJECTION, SOLUTION INTRAVENOUS CONTINUOUS
OUTPATIENT
Start: 2024-09-05

## 2024-09-05 RX ORDER — ZOLEDRONIC ACID 5 MG/100ML
5 INJECTION, SOLUTION INTRAVENOUS ONCE
OUTPATIENT
Start: 2024-09-05 | End: 2024-09-05

## 2024-09-05 RX ORDER — ONDANSETRON 2 MG/ML
8 INJECTION INTRAMUSCULAR; INTRAVENOUS
OUTPATIENT
Start: 2024-09-05

## 2024-09-05 RX ORDER — SODIUM CHLORIDE 0.9 % (FLUSH) 0.9 %
5-40 SYRINGE (ML) INJECTION PRN
OUTPATIENT
Start: 2024-09-05

## 2024-09-05 RX ORDER — ACETAMINOPHEN 325 MG/1
650 TABLET ORAL
OUTPATIENT
Start: 2024-09-05

## 2024-09-05 RX ORDER — HEPARIN SODIUM (PORCINE) LOCK FLUSH IV SOLN 100 UNIT/ML 100 UNIT/ML
500 SOLUTION INTRAVENOUS PRN
OUTPATIENT
Start: 2024-09-05

## 2024-09-05 RX ORDER — EPINEPHRINE 1 MG/ML
0.3 INJECTION, SOLUTION, CONCENTRATE INTRAVENOUS PRN
OUTPATIENT
Start: 2024-09-05

## 2024-09-05 RX ORDER — FAMOTIDINE 10 MG/ML
20 INJECTION, SOLUTION INTRAVENOUS
OUTPATIENT
Start: 2024-09-05

## 2024-09-05 RX ORDER — DIPHENHYDRAMINE HYDROCHLORIDE 50 MG/ML
50 INJECTION INTRAMUSCULAR; INTRAVENOUS
OUTPATIENT
Start: 2024-09-05

## 2024-09-05 RX ORDER — ALBUTEROL SULFATE 90 UG/1
4 AEROSOL, METERED RESPIRATORY (INHALATION) PRN
OUTPATIENT
Start: 2024-09-05

## 2024-09-05 ASSESSMENT — ENCOUNTER SYMPTOMS
SHORTNESS OF BREATH: 0
COUGH: 0
ABDOMINAL PAIN: 0
NAUSEA: 0
VOMITING: 0
BLOOD IN STOOL: 0

## 2024-09-05 NOTE — PROGRESS NOTES
[facility]   Rheumatology Clinic Note      9/5/2024       Reason for Consult:  osteoporosis  Requesting Physician:  Laly Alfonso MD     CHIEF COMPLAINT:    Chief Complaint   Patient presents with    New Patient     Osteoporosis    Other     Family Hx of osteoporosis  Stress fracture Rt foot.            HISTORY OF PRESENT ILLNESS:    54 y.o. female presents today for evaluation of osteoporosis.   States that there is strong family h/o osteoporosis (mother), but no known h/o fragility fractures.    H/o stress fracture right foot in April. Does not recall any injuries or trigger.     Lives an active life. Goes to the gym regularly and does cardio exercises. However, since her diagnosis of osteoporosis, she started with a  with strength exercises.    Does not smoke or drink alcohol.  Lives an active life.  No joint pain or joint swelling.  No h/o chronic steroid therapy.  No h/o prolonged immobilization.    States that her vitamin D level was low and she started taking vitamin D supplements.    Is premenopausal.       Past Medical History:     has a past medical history of Cavernous hemangioma of brain (HCC), Gastroesophageal reflux disease without esophagitis, Mixed hyperlipidemia, and Seasonal allergic rhinitis.    Past Surgical History:     has no past surgical history on file.    Current Medications:      Current Outpatient Medications:     Cholecalciferol (VITAMIN D3) 1.25 MG (27875 UT) TABS, , Disp: , Rfl:     acetaminophen (TYLENOL) 500 MG tablet, Take 1 tablet by mouth every 6 hours as needed for Pain, Disp: , Rfl:     Allergies:    Patient has no known allergies.    Social History:     reports that she has never smoked. She has never used smokeless tobacco. She reports that she does not currently use alcohol. She reports that she does not use drugs.    Family History:   family history includes Alzheimer's Disease in her father; Heart Disease in her father and maternal grandmother;

## 2024-09-06 ENCOUNTER — LAB (OUTPATIENT)
Dept: LAB | Age: 54
End: 2024-09-06

## 2024-09-06 DIAGNOSIS — M81.0 OSTEOPOROSIS WITHOUT CURRENT PATHOLOGICAL FRACTURE, UNSPECIFIED OSTEOPOROSIS TYPE: ICD-10-CM

## 2024-09-06 LAB
ALBUMIN SERPL BCG-MCNC: 4.7 G/DL (ref 3.5–5.1)
ALP SERPL-CCNC: 71 U/L (ref 38–126)
ALT SERPL W/O P-5'-P-CCNC: 9 U/L (ref 11–66)
ANION GAP SERPL CALC-SCNC: 12 MEQ/L (ref 8–16)
AST SERPL-CCNC: 12 U/L (ref 5–40)
BILIRUB SERPL-MCNC: 0.6 MG/DL (ref 0.3–1.2)
BUN SERPL-MCNC: 11 MG/DL (ref 7–22)
CALCIUM SERPL-MCNC: 9.5 MG/DL (ref 8.5–10.5)
CHLORIDE SERPL-SCNC: 102 MEQ/L (ref 98–111)
CO2 SERPL-SCNC: 25 MEQ/L (ref 23–33)
CREAT SERPL-MCNC: 0.8 MG/DL (ref 0.4–1.2)
ERYTHROCYTE [SEDIMENTATION RATE] IN BLOOD BY WESTERGREN METHOD: 7 MM/HR (ref 0–20)
GFR SERPL CREATININE-BSD FRML MDRD: 88 ML/MIN/1.73M2
GLUCOSE SERPL-MCNC: 90 MG/DL (ref 70–108)
POTASSIUM SERPL-SCNC: 4.5 MEQ/L (ref 3.5–5.2)
PROT SERPL-MCNC: 7.5 G/DL (ref 6.1–8)
PTH-INTACT SERPL-MCNC: 50.7 PG/ML (ref 15–65)
SODIUM SERPL-SCNC: 139 MEQ/L (ref 135–145)

## 2024-09-08 LAB — GLIADIN PEPTIDE IGA SER IA-ACNC: < 0.72 FLU (ref 0–4.99)

## 2024-09-09 ENCOUNTER — LAB (OUTPATIENT)
Dept: LAB | Age: 54
End: 2024-09-09

## 2024-09-09 DIAGNOSIS — M81.0 OSTEOPOROSIS WITHOUT CURRENT PATHOLOGICAL FRACTURE, UNSPECIFIED OSTEOPOROSIS TYPE: ICD-10-CM

## 2024-09-09 LAB
CALCIUM 24H UR-MRATE: 150 MG/24HR (ref 100–240)
CALCIUM UR-MCNC: 13.6 MG/DL
CREAT 24H UR-MRATE: 0.7 GM/24HR (ref 0.7–1.6)
CREAT UR-MCNC: 65 MG/DL
GLIADIN PEPTIDE IGG SER IA-ACNC: 1.28 FLU (ref 0–4.99)
HOURS COLLECTED: 24 HRS
HOURS COLLECTED: 24 HRS
TTG IGA SER IA-ACNC: < 1.02 FLU (ref 0–4.99)
TTG IGG SER IA-ACNC: 0.88 FLU (ref 0–4.99)
URINE VOLUME MEASURE: 1100 ML
URINE VOLUME, 24 HOUR: 1100 ML

## 2024-09-23 ENCOUNTER — TELEPHONE (OUTPATIENT)
Dept: RHEUMATOLOGY | Age: 54
End: 2024-09-23

## 2024-09-23 DIAGNOSIS — M81.0 OSTEOPOROSIS WITHOUT CURRENT PATHOLOGICAL FRACTURE, UNSPECIFIED OSTEOPOROSIS TYPE: Primary | ICD-10-CM

## 2024-09-24 RX ORDER — ALENDRONATE SODIUM 70 MG/1
70 TABLET ORAL
Qty: 4 TABLET | Refills: 2 | Status: SHIPPED | OUTPATIENT
Start: 2024-09-24

## 2024-12-11 ENCOUNTER — OFFICE VISIT (OUTPATIENT)
Dept: RHEUMATOLOGY | Age: 54
End: 2024-12-11
Payer: COMMERCIAL

## 2024-12-11 VITALS
BODY MASS INDEX: 23.96 KG/M2 | WEIGHT: 130.2 LBS | HEART RATE: 77 BPM | OXYGEN SATURATION: 98 % | DIASTOLIC BLOOD PRESSURE: 76 MMHG | HEIGHT: 62 IN | SYSTOLIC BLOOD PRESSURE: 118 MMHG

## 2024-12-11 DIAGNOSIS — M81.0 OSTEOPOROSIS WITHOUT CURRENT PATHOLOGICAL FRACTURE, UNSPECIFIED OSTEOPOROSIS TYPE: Primary | ICD-10-CM

## 2024-12-11 PROCEDURE — 99214 OFFICE O/P EST MOD 30 MIN: CPT | Performed by: NURSE PRACTITIONER

## 2024-12-11 RX ORDER — ALBUTEROL SULFATE 90 UG/1
4 INHALANT RESPIRATORY (INHALATION) PRN
OUTPATIENT
Start: 2024-12-11

## 2024-12-11 RX ORDER — SODIUM CHLORIDE 9 MG/ML
INJECTION, SOLUTION INTRAVENOUS CONTINUOUS
OUTPATIENT
Start: 2024-12-11

## 2024-12-11 RX ORDER — HYDROCORTISONE SODIUM SUCCINATE 100 MG/2ML
100 INJECTION INTRAMUSCULAR; INTRAVENOUS
OUTPATIENT
Start: 2024-12-11

## 2024-12-11 RX ORDER — EPINEPHRINE 1 MG/ML
0.3 INJECTION, SOLUTION, CONCENTRATE INTRAVENOUS PRN
OUTPATIENT
Start: 2024-12-11

## 2024-12-11 RX ORDER — FAMOTIDINE 10 MG/ML
20 INJECTION, SOLUTION INTRAVENOUS
OUTPATIENT
Start: 2024-12-11

## 2024-12-11 RX ORDER — DIPHENHYDRAMINE HYDROCHLORIDE 50 MG/ML
50 INJECTION INTRAMUSCULAR; INTRAVENOUS
OUTPATIENT
Start: 2024-12-11

## 2024-12-11 RX ORDER — ACETAMINOPHEN 325 MG/1
650 TABLET ORAL
OUTPATIENT
Start: 2024-12-11

## 2024-12-11 RX ORDER — ONDANSETRON 2 MG/ML
8 INJECTION INTRAMUSCULAR; INTRAVENOUS
OUTPATIENT
Start: 2024-12-11

## 2024-12-11 ASSESSMENT — ENCOUNTER SYMPTOMS
DIARRHEA: 0
BACK PAIN: 0
EYE PAIN: 0
TROUBLE SWALLOWING: 0
COLOR CHANGE: 0
ABDOMINAL PAIN: 0
BLOOD IN STOOL: 0
SHORTNESS OF BREATH: 0

## 2024-12-11 NOTE — PROGRESS NOTES
.  
unexpected weight change.   HENT:  Negative for dental problem, mouth sores and trouble swallowing.    Eyes:  Negative for pain and visual disturbance.   Respiratory:  Negative for shortness of breath.    Cardiovascular:  Negative for chest pain and leg swelling.   Gastrointestinal:  Negative for abdominal pain, blood in stool and diarrhea.   Endocrine: Negative for cold intolerance and heat intolerance.   Musculoskeletal:  Negative for arthralgias, back pain, gait problem, joint swelling, myalgias and neck pain.   Skin:  Negative for color change and rash.   Allergic/Immunologic: Negative for immunocompromised state.   Neurological:  Negative for weakness and numbness.          PHYSICAL EXAM:    General appearance: No apparent distress, appears stated age and cooperative.  Eyes: Normal conjunctiva/sclera  Mouth: N/A   Respiratory:  Normal respiratory effort. Clear to auscultation, bilaterally without Rales/Wheezes/Rhonchi.  Cardiovascular: Regular rate and rhythm without murmurs, rubs or gallops.  Musculoskeletal: No clubbing, cyanosis or edema bilaterally. No joint pain.  Skin: Pink, warm, dry. No rashes or lesions.  Neurologic: Alert and oriented, thought content appropriate, normal insight    Vitals:    /76 (Site: Left Lower Arm, Position: Sitting, Cuff Size: Medium Adult)   Pulse 77   Ht 1.575 m (5' 2.01\")   Wt 59.1 kg (130 lb 3.2 oz)   SpO2 98%   BMI 23.81 kg/m²     LABS:      Lab Results   Component Value Date    WBC 6.2 03/27/2024    HGB 13.7 03/27/2024    HCT 40.5 03/27/2024    MCV 91.2 03/27/2024     03/27/2024      Lab Results   Component Value Date     09/06/2024    K 4.5 09/06/2024     09/06/2024    CO2 25 09/06/2024    BUN 11 09/06/2024    CREATININE 0.8 09/06/2024    GLUCOSE 90 09/06/2024    CALCIUM 9.5 09/06/2024    BILITOT 0.6 09/06/2024    ALKPHOS 71 09/06/2024    AST 12 09/06/2024    ALT 9 (L) 09/06/2024    LABGLOM 88 09/06/2024     Lab Results   Component Value Date

## 2024-12-16 ENCOUNTER — OFFICE VISIT (OUTPATIENT)
Dept: FAMILY MEDICINE CLINIC | Age: 54
End: 2024-12-16
Payer: COMMERCIAL

## 2024-12-16 VITALS
BODY MASS INDEX: 24.29 KG/M2 | DIASTOLIC BLOOD PRESSURE: 68 MMHG | WEIGHT: 132 LBS | RESPIRATION RATE: 16 BRPM | OXYGEN SATURATION: 100 % | HEIGHT: 62 IN | SYSTOLIC BLOOD PRESSURE: 110 MMHG | HEART RATE: 60 BPM

## 2024-12-16 DIAGNOSIS — Z51.89 ENCOUNTER FOR POST-TRAUMATIC WOUND CHECK: Primary | ICD-10-CM

## 2024-12-16 DIAGNOSIS — S61.209D FINGER AVULSION, SUBSEQUENT ENCOUNTER: ICD-10-CM

## 2024-12-16 PROCEDURE — 99213 OFFICE O/P EST LOW 20 MIN: CPT | Performed by: NURSE PRACTITIONER

## 2024-12-16 ASSESSMENT — ENCOUNTER SYMPTOMS
SORE THROAT: 0
WHEEZING: 0
SHORTNESS OF BREATH: 0
CONSTIPATION: 0
EYE REDNESS: 0
RHINORRHEA: 0
TROUBLE SWALLOWING: 0
ALLERGIC/IMMUNOLOGIC NEGATIVE: 1
ABDOMINAL PAIN: 0
BACK PAIN: 0
NAUSEA: 0
DIARRHEA: 0
EYE DISCHARGE: 0
COUGH: 0
VOMITING: 0
EYE PAIN: 0

## 2024-12-16 NOTE — PROGRESS NOTES
Review of Systems   Constitutional:  Negative for activity change, fatigue and fever.   HENT:  Negative for congestion, ear pain, rhinorrhea, sore throat and trouble swallowing.    Eyes:  Negative for pain, discharge and redness.   Respiratory:  Negative for cough, shortness of breath and wheezing.    Cardiovascular: Negative.    Gastrointestinal:  Negative for abdominal pain, constipation, diarrhea, nausea and vomiting.   Endocrine: Negative.    Genitourinary:  Negative for dysuria, frequency and urgency.   Musculoskeletal:  Negative for arthralgias, back pain and myalgias.   Skin:  Positive for wound. Negative for rash.   Allergic/Immunologic: Negative.    Neurological:  Negative for dizziness, tremors, weakness and headaches.   Hematological: Negative.    Psychiatric/Behavioral:  Negative for dysphoric mood and sleep disturbance. The patient is not nervous/anxious.        Objective:     /68   Pulse 60   Resp 16   Ht 1.575 m (5' 2\")   Wt 59.9 kg (132 lb)   SpO2 100%   BMI 24.14 kg/m²     Physical Exam  Vitals and nursing note reviewed.   Constitutional:       General: She is not in acute distress.     Appearance: She is well-developed. She is not ill-appearing or diaphoretic.   HENT:      Right Ear: External ear normal.      Left Ear: External ear normal.      Nose: Nose normal.   Eyes:      General:         Right eye: No discharge.         Left eye: No discharge.      Conjunctiva/sclera: Conjunctivae normal.      Pupils: Pupils are equal, round, and reactive to light.   Neck:      Vascular: No JVD.   Cardiovascular:      Rate and Rhythm: Normal rate and regular rhythm.   Pulmonary:      Effort: Pulmonary effort is normal. No respiratory distress.   Musculoskeletal:         General: No tenderness or deformity. Normal range of motion.      Cervical back: Normal range of motion.   Skin:     General: Skin is warm and dry.      Capillary Refill: Capillary refill takes less than 2 seconds.

## 2025-02-11 SDOH — ECONOMIC STABILITY: INCOME INSECURITY: IN THE LAST 12 MONTHS, WAS THERE A TIME WHEN YOU WERE NOT ABLE TO PAY THE MORTGAGE OR RENT ON TIME?: NO

## 2025-02-11 SDOH — ECONOMIC STABILITY: FOOD INSECURITY: WITHIN THE PAST 12 MONTHS, YOU WORRIED THAT YOUR FOOD WOULD RUN OUT BEFORE YOU GOT MONEY TO BUY MORE.: NEVER TRUE

## 2025-02-11 SDOH — ECONOMIC STABILITY: FOOD INSECURITY: WITHIN THE PAST 12 MONTHS, THE FOOD YOU BOUGHT JUST DIDN'T LAST AND YOU DIDN'T HAVE MONEY TO GET MORE.: NEVER TRUE

## 2025-02-11 SDOH — ECONOMIC STABILITY: TRANSPORTATION INSECURITY
IN THE PAST 12 MONTHS, HAS THE LACK OF TRANSPORTATION KEPT YOU FROM MEDICAL APPOINTMENTS OR FROM GETTING MEDICATIONS?: NO

## 2025-02-11 SDOH — ECONOMIC STABILITY: TRANSPORTATION INSECURITY
IN THE PAST 12 MONTHS, HAS LACK OF TRANSPORTATION KEPT YOU FROM MEETINGS, WORK, OR FROM GETTING THINGS NEEDED FOR DAILY LIVING?: NO

## 2025-02-11 ASSESSMENT — PATIENT HEALTH QUESTIONNAIRE - PHQ9
SUM OF ALL RESPONSES TO PHQ QUESTIONS 1-9: 0
1. LITTLE INTEREST OR PLEASURE IN DOING THINGS: NOT AT ALL
1. LITTLE INTEREST OR PLEASURE IN DOING THINGS: NOT AT ALL
2. FEELING DOWN, DEPRESSED OR HOPELESS: NOT AT ALL
SUM OF ALL RESPONSES TO PHQ QUESTIONS 1-9: 0
SUM OF ALL RESPONSES TO PHQ9 QUESTIONS 1 & 2: 0
SUM OF ALL RESPONSES TO PHQ9 QUESTIONS 1 & 2: 0
2. FEELING DOWN, DEPRESSED OR HOPELESS: NOT AT ALL

## 2025-02-12 ENCOUNTER — OFFICE VISIT (OUTPATIENT)
Dept: FAMILY MEDICINE CLINIC | Age: 55
End: 2025-02-12
Payer: COMMERCIAL

## 2025-02-12 ENCOUNTER — LAB (OUTPATIENT)
Dept: LAB | Age: 55
End: 2025-02-12

## 2025-02-12 VITALS
OXYGEN SATURATION: 100 % | BODY MASS INDEX: 24.14 KG/M2 | SYSTOLIC BLOOD PRESSURE: 112 MMHG | WEIGHT: 132 LBS | TEMPERATURE: 98.4 F | DIASTOLIC BLOOD PRESSURE: 70 MMHG | HEART RATE: 80 BPM

## 2025-02-12 DIAGNOSIS — D50.8 OTHER IRON DEFICIENCY ANEMIA: ICD-10-CM

## 2025-02-12 DIAGNOSIS — Z86.32 HISTORY OF GESTATIONAL DIABETES: ICD-10-CM

## 2025-02-12 DIAGNOSIS — E55.9 HYPOVITAMINOSIS D: ICD-10-CM

## 2025-02-12 DIAGNOSIS — E55.9 HYPOVITAMINOSIS D: Primary | ICD-10-CM

## 2025-02-12 DIAGNOSIS — R00.2 PALPITATIONS: ICD-10-CM

## 2025-02-12 LAB
25(OH)D3 SERPL-MCNC: 53 NG/ML (ref 30–100)
DEPRECATED MEAN GLUCOSE BLD GHB EST-ACNC: 96 MG/DL (ref 70–126)
HBA1C MFR BLD HPLC: 5.2 % (ref 4.4–6.4)
IRON SERPL-MCNC: 143 UG/DL (ref 50–170)
VIT B12 SERPL-MCNC: 173 PG/ML (ref 211–911)

## 2025-02-12 PROCEDURE — 83036 HEMOGLOBIN GLYCOSYLATED A1C: CPT | Performed by: NURSE PRACTITIONER

## 2025-02-12 PROCEDURE — 93000 ELECTROCARDIOGRAM COMPLETE: CPT | Performed by: NURSE PRACTITIONER

## 2025-02-12 PROCEDURE — 99214 OFFICE O/P EST MOD 30 MIN: CPT | Performed by: NURSE PRACTITIONER

## 2025-02-12 RX ORDER — CALCIUM CARBONATE 300MG(750)
500 TABLET,CHEWABLE ORAL DAILY
COMMUNITY
Start: 2025-01-01

## 2025-02-12 ASSESSMENT — ENCOUNTER SYMPTOMS
DIARRHEA: 0
ABDOMINAL PAIN: 0
SHORTNESS OF BREATH: 0
RHINORRHEA: 0
SORE THROAT: 0
EYE PAIN: 0
ALLERGIC/IMMUNOLOGIC NEGATIVE: 1
EYE DISCHARGE: 0
WHEEZING: 0
COUGH: 0
VOMITING: 0
EYE REDNESS: 0
BACK PAIN: 0
NAUSEA: 0
CONSTIPATION: 0
TROUBLE SWALLOWING: 0

## 2025-02-12 NOTE — PROGRESS NOTES
[facility]  [facility]  [address] [id number]  Dept: [phone]  Dept Fax: [phone]  Loc: [phone]     Visit Date:  2/12/2025      Patient:  Paula Benavides  YOB: 1970    HPI:     Chief Complaint   Patient presents with    6 Month Follow-Up     Patient states no concerns today; would like routine lab orders     Discuss Labs       HPI    Medications    Current Outpatient Medications:     Magnesium 400 MG TABS, Take 500 mg by mouth daily, Disp: , Rfl:     Cholecalciferol (VITAMIN D3) 1.25 MG (84362 UT) TABS, , Disp: , Rfl:     acetaminophen (TYLENOL) 500 MG tablet, Take 1 tablet by mouth every 6 hours as needed for Pain, Disp: , Rfl:     The patient has No Known Allergies.    Past Medical History  Paula  has a past medical history of Cavernous hemangioma of brain (HCC), Gastroesophageal reflux disease without esophagitis, Mixed hyperlipidemia, and Seasonal allergic rhinitis.    Subjective:      Review of Systems   Constitutional:  Negative for activity change, fatigue and fever.   HENT:  Negative for congestion, ear pain, rhinorrhea, sore throat and trouble swallowing.    Eyes:  Negative for pain, discharge and redness.   Respiratory:  Negative for cough, shortness of breath and wheezing.    Cardiovascular:  Positive for palpitations.   Gastrointestinal:  Negative for abdominal pain, constipation, diarrhea, nausea and vomiting.   Endocrine: Negative.    Genitourinary:  Negative for dysuria, frequency and urgency.   Musculoskeletal:  Negative for arthralgias, back pain and myalgias.   Skin:  Negative for rash.   Allergic/Immunologic: Negative.    Neurological:  Negative for dizziness, tremors, weakness and headaches.   Hematological: Negative.    Psychiatric/Behavioral:  Negative for dysphoric mood and sleep disturbance. The patient is not nervous/anxious.        Objective:     /70 (Site: Left Upper Arm)   Pulse 80   Temp

## 2025-02-15 LAB — PYRIDOXAL PHOS SERPL-SCNC: 32.5 NMOL/L (ref 20–125)

## 2025-03-12 ENCOUNTER — OFFICE VISIT (OUTPATIENT)
Age: 55
End: 2025-03-12
Payer: COMMERCIAL

## 2025-03-12 ENCOUNTER — TELEPHONE (OUTPATIENT)
Age: 55
End: 2025-03-12

## 2025-03-12 ENCOUNTER — LAB (OUTPATIENT)
Dept: LAB | Age: 55
End: 2025-03-12

## 2025-03-12 VITALS
HEIGHT: 62 IN | SYSTOLIC BLOOD PRESSURE: 106 MMHG | WEIGHT: 131.6 LBS | HEART RATE: 76 BPM | DIASTOLIC BLOOD PRESSURE: 66 MMHG | OXYGEN SATURATION: 96 % | BODY MASS INDEX: 24.22 KG/M2

## 2025-03-12 DIAGNOSIS — M81.0 OSTEOPOROSIS WITHOUT CURRENT PATHOLOGICAL FRACTURE, UNSPECIFIED OSTEOPOROSIS TYPE: ICD-10-CM

## 2025-03-12 DIAGNOSIS — M81.0 OSTEOPOROSIS WITHOUT CURRENT PATHOLOGICAL FRACTURE, UNSPECIFIED OSTEOPOROSIS TYPE: Primary | ICD-10-CM

## 2025-03-12 LAB
ANION GAP SERPL CALC-SCNC: 11 MEQ/L (ref 8–16)
BUN SERPL-MCNC: 19 MG/DL (ref 8–23)
CALCIUM SERPL-MCNC: 9.7 MG/DL (ref 8.6–10)
CHLORIDE SERPL-SCNC: 103 MEQ/L (ref 98–111)
CO2 SERPL-SCNC: 26 MEQ/L (ref 22–29)
CREAT SERPL-MCNC: 0.9 MG/DL (ref 0.5–0.9)
GFR SERPL CREATININE-BSD FRML MDRD: 76 ML/MIN/1.73M2
GLUCOSE SERPL-MCNC: 94 MG/DL (ref 74–109)
PHOSPHATE SERPL-MCNC: 3.6 MG/DL (ref 2.5–4.5)
POTASSIUM SERPL-SCNC: 4.3 MEQ/L (ref 3.5–5.2)
SODIUM SERPL-SCNC: 140 MEQ/L (ref 135–145)

## 2025-03-12 PROCEDURE — 99214 OFFICE O/P EST MOD 30 MIN: CPT | Performed by: INTERNAL MEDICINE

## 2025-03-12 RX ORDER — DENOSUMAB 60 MG/ML
60 INJECTION SUBCUTANEOUS ONCE
Qty: 60 ML | Refills: 0 | Status: ACTIVE | OUTPATIENT
Start: 2025-03-12 | End: 2025-03-12

## 2025-03-12 ASSESSMENT — ENCOUNTER SYMPTOMS
COUGH: 0
SHORTNESS OF BREATH: 0
NAUSEA: 0
ABDOMINAL PAIN: 0

## 2025-03-12 NOTE — PROGRESS NOTES
[facility]   Rheumatology Clinic Note      3/12/2025       CHIEF COMPLAINT:    Chief Complaint   Patient presents with    3 Month Follow-Up     Osteoporosis without current pathological fracture, unspecified osteoporosis type    Other     Pt is tolerating well           HISTORY OF PRESENT ILLNESS:    55 y.o. female with osteoporosis and h/o stress fracture of the right foot. When seen last, plan was to start Prolia SQ.    Prolia was not started. It was approved, but was never ordered.  Pt has no complaints or concerns at this time.  Is staying active with walking and hiking.      H/o stress fracture right foot in April 2024. Does not recall any injuries or trigger.     Does not smoke or drink alcohol.  No joint pain or joint swelling.  No h/o chronic steroid therapy.  No h/o prolonged immobilization.      Past Medical History:     has a past medical history of Cavernous hemangioma of brain (HCC), Gastroesophageal reflux disease without esophagitis, Mixed hyperlipidemia, and Seasonal allergic rhinitis.    Past Surgical History:     has no past surgical history on file.    Current Medications:      Current Outpatient Medications:     denosumab (PROLIA) 60 MG/ML SOSY SC injection, Inject 1 mL into the skin once for 1 dose, Disp: 60 mL, Rfl: 0    Magnesium 400 MG TABS, Take 500 mg by mouth daily, Disp: , Rfl:     Cholecalciferol (VITAMIN D3) 1.25 MG (86741 UT) TABS, , Disp: , Rfl:     acetaminophen (TYLENOL) 500 MG tablet, Take 1 tablet by mouth every 6 hours as needed for Pain, Disp: , Rfl:     Allergies:    Patient has no known allergies.    Social History:     reports that she has never smoked. She has never used smokeless tobacco. She reports that she does not currently use alcohol. She reports that she does not use drugs.    Family History:   family history includes Alzheimer's Disease in her father; Heart Disease in her father and maternal grandmother; Osteoporosis in her mother; Prostate Cancer (age

## 2025-03-19 NOTE — TELEPHONE ENCOUNTER
Attempted to contact the pt, no answer. Following up to see if the patient was able to speak with the pharmacy or [facility] regarding her Prolia. Will need to know if the patient has received medication.

## 2025-03-19 NOTE — TELEPHONE ENCOUNTER
Patient returned call and states she spoke with [facility] and they are going to ship it here and we should have it by Monday at the latest.     She is also going to be on a cruise all next week so she will not be available to answer when we call to tell her its in and sched FYI

## 2025-03-21 NOTE — TELEPHONE ENCOUNTER
Prolia received and placed in office refrigerator. Nubity message sent to notify patient to schedule appointment for Prolia injection.

## 2025-04-01 ENCOUNTER — OFFICE VISIT (OUTPATIENT)
Age: 55
End: 2025-04-01
Payer: COMMERCIAL

## 2025-04-01 DIAGNOSIS — M81.0 OSTEOPOROSIS WITHOUT CURRENT PATHOLOGICAL FRACTURE, UNSPECIFIED OSTEOPOROSIS TYPE: Primary | ICD-10-CM

## 2025-04-01 PROCEDURE — 99211 OFF/OP EST MAY X REQ PHY/QHP: CPT | Performed by: INTERNAL MEDICINE

## 2025-04-01 PROCEDURE — 96372 THER/PROPH/DIAG INJ SC/IM: CPT | Performed by: INTERNAL MEDICINE

## 2025-04-01 NOTE — PROGRESS NOTES
This drug was administered by Jami Gonzalez MA at 12:00 PM per physician order.  Details:  Number    [employer]   NDC [id number]   Site: Right arm   Route: Subcutaneous    Injection given in right arm subcuatenous, Patient tolerated well.

## 2025-04-28 ENCOUNTER — HOSPITAL ENCOUNTER (OUTPATIENT)
Dept: MAMMOGRAPHY | Age: 55
Discharge: HOME | End: 2025-04-28
Payer: COMMERCIAL

## 2025-04-28 VITALS — BODY MASS INDEX: 25.72 KG/M2 | WEIGHT: 131 LBS | HEIGHT: 60 IN

## 2025-04-28 DIAGNOSIS — Z12.31 VISIT FOR SCREENING MAMMOGRAM: ICD-10-CM

## 2025-04-28 PROCEDURE — 77063 BREAST TOMOSYNTHESIS BI: CPT

## 2025-08-12 ENCOUNTER — LAB (OUTPATIENT)
Dept: LAB | Age: 55
End: 2025-08-12

## 2025-08-12 ENCOUNTER — OFFICE VISIT (OUTPATIENT)
Dept: FAMILY MEDICINE CLINIC | Age: 55
End: 2025-08-12
Payer: COMMERCIAL

## 2025-08-12 VITALS
DIASTOLIC BLOOD PRESSURE: 68 MMHG | HEART RATE: 70 BPM | SYSTOLIC BLOOD PRESSURE: 108 MMHG | OXYGEN SATURATION: 99 % | WEIGHT: 132.4 LBS | HEIGHT: 61 IN | BODY MASS INDEX: 25 KG/M2

## 2025-08-12 DIAGNOSIS — R79.89 LOW VITAMIN B12 LEVEL: ICD-10-CM
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
## 2025-08-12 DIAGNOSIS — E78.2 MIXED HYPERLIPIDEMIA: ICD-10-CM

## 2025-08-12 DIAGNOSIS — M81.0 OSTEOPOROSIS WITHOUT CURRENT PATHOLOGICAL FRACTURE, UNSPECIFIED OSTEOPOROSIS TYPE: ICD-10-CM

## 2025-08-12 DIAGNOSIS — E78.2 MIXED HYPERLIPIDEMIA: Primary | ICD-10-CM

## 2025-08-12 LAB
CHOLEST SERPL-MCNC: 250 MG/DL (ref 100–199)
HDLC SERPL-MCNC: 50 MG/DL
LDLC SERPL CALC-MCNC: 174 MG/DL
TRIGL SERPL-MCNC: 130 MG/DL (ref 0–199)
VIT B12 SERPL-MCNC: 160 PG/ML (ref 232–1245)

## 2025-08-12 PROCEDURE — 99214 OFFICE O/P EST MOD 30 MIN: CPT | Performed by: NURSE PRACTITIONER

## 2025-08-12 ASSESSMENT — ENCOUNTER SYMPTOMS
WHEEZING: 0
ABDOMINAL PAIN: 0
DIARRHEA: 0
SHORTNESS OF BREATH: 0
EYE PAIN: 0
EYE DISCHARGE: 0
ALLERGIC/IMMUNOLOGIC NEGATIVE: 1
TROUBLE SWALLOWING: 0
NAUSEA: 0
EYE REDNESS: 0
SORE THROAT: 0
VOMITING: 0
COUGH: 0
RHINORRHEA: 0
CONSTIPATION: 0
BACK PAIN: 0